# Patient Record
Sex: FEMALE | Race: WHITE | NOT HISPANIC OR LATINO
[De-identification: names, ages, dates, MRNs, and addresses within clinical notes are randomized per-mention and may not be internally consistent; named-entity substitution may affect disease eponyms.]

---

## 2020-11-09 ENCOUNTER — APPOINTMENT (OUTPATIENT)
Dept: PULMONOLOGY | Facility: HOSPITAL | Age: 60
End: 2020-11-09
Payer: COMMERCIAL

## 2020-11-09 VITALS — HEIGHT: 63 IN | BODY MASS INDEX: 40.75 KG/M2 | WEIGHT: 230 LBS

## 2020-11-09 DIAGNOSIS — I10 ESSENTIAL (PRIMARY) HYPERTENSION: ICD-10-CM

## 2020-11-09 DIAGNOSIS — D47.2 MONOCLONAL GAMMOPATHY: ICD-10-CM

## 2020-11-09 DIAGNOSIS — F17.200 NICOTINE DEPENDENCE, UNSPECIFIED, UNCOMPLICATED: ICD-10-CM

## 2020-11-09 DIAGNOSIS — G47.33 OBSTRUCTIVE SLEEP APNEA (ADULT) (PEDIATRIC): ICD-10-CM

## 2020-11-09 PROBLEM — Z00.00 ENCOUNTER FOR PREVENTIVE HEALTH EXAMINATION: Status: ACTIVE | Noted: 2020-11-09

## 2020-11-09 PROCEDURE — 99243 OFF/OP CNSLTJ NEW/EST LOW 30: CPT

## 2020-11-09 RX ORDER — OXYCODONE AND ACETAMINOPHEN 5; 325 MG/1; MG/1
5-325 TABLET ORAL
Refills: 0 | Status: ACTIVE | COMMUNITY

## 2020-11-09 RX ORDER — LISINOPRIL 40 MG/1
40 TABLET ORAL
Refills: 0 | Status: ACTIVE | COMMUNITY

## 2020-11-09 RX ORDER — METHOTREXATE 2.5 MG/1
TABLET ORAL
Refills: 0 | Status: ACTIVE | COMMUNITY

## 2020-11-09 RX ORDER — PANTOPRAZOLE SODIUM 20 MG/1
TABLET, DELAYED RELEASE ORAL
Refills: 0 | Status: ACTIVE | COMMUNITY

## 2020-11-09 RX ORDER — ATORVASTATIN CALCIUM 40 MG/1
40 TABLET, FILM COATED ORAL
Refills: 0 | Status: ACTIVE | COMMUNITY

## 2020-11-09 RX ORDER — PAROXETINE HYDROCHLORIDE 40 MG/1
TABLET, FILM COATED ORAL
Refills: 0 | Status: ACTIVE | COMMUNITY

## 2020-11-09 RX ORDER — NABUMETONE 500 MG/1
500 TABLET, FILM COATED ORAL
Refills: 0 | Status: ACTIVE | COMMUNITY

## 2020-11-09 RX ORDER — PILOCARPINE HCL 6 %
DROPS OPHTHALMIC (EYE)
Refills: 0 | Status: ACTIVE | COMMUNITY

## 2020-11-09 RX ORDER — FLUTICASONE PROPIONATE 50 UG/1
50 SPRAY, METERED NASAL
Refills: 0 | Status: ACTIVE | COMMUNITY

## 2020-11-09 NOTE — ASSESSMENT
[FreeTextEntry1] : 60-year-old woman with mild obstructive sleep apnea with other medical problems, patient also has high blood pressure.\par \par Given excessive daytime sleepiness patient will benefit with CPAP therapy.  Will order AutoPap 5 to 20 cm and look at the download data in few weeks times.

## 2020-11-09 NOTE — REVIEW OF SYSTEMS
[EDS: ESS=____] : daytime somnolence: ESS=[unfilled] [Snoring] : snoring [Witnessed Apneas] : witnessed apnea [Negative] : Psychiatric

## 2020-11-09 NOTE — PHYSICAL EXAM
[Heart Sounds] : normal S1 and S2 [Murmurs] : no murmurs [] : no respiratory distress [Auscultation Breath Sounds / Voice Sounds] : lungs were clear to auscultation bilaterally [FreeTextEntry1] : no edema [No Focal Deficits] : no focal deficits [Oriented To Time, Place, And Person] : oriented to person, place, and time

## 2020-11-09 NOTE — HISTORY OF PRESENT ILLNESS
[FreeTextEntry1] : PMD: Dr. Hawkins\par Dr. Virgen\par 60 year old woman  with history of MGUS, chronic pain, mixed connective tissue disease is here in the sleep center to address restless sleep.  Patient is sleepy with [place] sleepiness score of 12.  Patient has loud snoring, does not have any witnessed apneas.  Patient's bedtime is around 11 PM wakes up in the morning around 8 AM.  She feels tired when she wakes up. Her sleep is disturbed and wakes up few times at night. Patient does not drink caffeinated products during the daytime, patient has headaches, no nocturia. She is sleepy while driving.\par Due to above symptoms pt underwent home study which showed mild sleep apnea with desaturations.\par ahi 6.1, lowest o2 sat 77%\par

## 2021-09-27 ENCOUNTER — APPOINTMENT (OUTPATIENT)
Dept: VASCULAR SURGERY | Facility: CLINIC | Age: 61
End: 2021-09-27

## 2022-09-14 ENCOUNTER — NON-APPOINTMENT (OUTPATIENT)
Age: 62
End: 2022-09-14

## 2022-09-14 ENCOUNTER — APPOINTMENT (OUTPATIENT)
Dept: BREAST CENTER | Facility: CLINIC | Age: 62
End: 2022-09-14

## 2022-09-14 VITALS
BODY MASS INDEX: 38.36 KG/M2 | WEIGHT: 216.5 LBS | HEART RATE: 85 BPM | HEIGHT: 63 IN | DIASTOLIC BLOOD PRESSURE: 97 MMHG | SYSTOLIC BLOOD PRESSURE: 173 MMHG

## 2022-09-14 DIAGNOSIS — Z86.79 PERSONAL HISTORY OF OTHER DISEASES OF THE CIRCULATORY SYSTEM: ICD-10-CM

## 2022-09-14 DIAGNOSIS — Z80.0 FAMILY HISTORY OF MALIGNANT NEOPLASM OF DIGESTIVE ORGANS: ICD-10-CM

## 2022-09-14 DIAGNOSIS — Z87.891 PERSONAL HISTORY OF NICOTINE DEPENDENCE: ICD-10-CM

## 2022-09-14 DIAGNOSIS — Z87.09 PERSONAL HISTORY OF OTHER DISEASES OF THE RESPIRATORY SYSTEM: ICD-10-CM

## 2022-09-14 DIAGNOSIS — F12.90 CANNABIS USE, UNSPECIFIED, UNCOMPLICATED: ICD-10-CM

## 2022-09-14 DIAGNOSIS — M35.1 OTHER OVERLAP SYNDROMES: ICD-10-CM

## 2022-09-14 DIAGNOSIS — D24.2 BENIGN NEOPLASM OF LEFT BREAST: ICD-10-CM

## 2022-09-14 DIAGNOSIS — Z87.448 PERSONAL HISTORY OF OTHER DISEASES OF URINARY SYSTEM: ICD-10-CM

## 2022-09-14 DIAGNOSIS — R92.2 INCONCLUSIVE MAMMOGRAM: ICD-10-CM

## 2022-09-14 DIAGNOSIS — Z86.59 PERSONAL HISTORY OF OTHER MENTAL AND BEHAVIORAL DISORDERS: ICD-10-CM

## 2022-09-14 DIAGNOSIS — Z86.03 PERSONAL HISTORY OF NEOPLASM OF UNCERTAIN BEHAVIOR: ICD-10-CM

## 2022-09-14 DIAGNOSIS — M32.9 SYSTEMIC LUPUS ERYTHEMATOSUS, UNSPECIFIED: ICD-10-CM

## 2022-09-14 DIAGNOSIS — N60.92 UNSPECIFIED BENIGN MAMMARY DYSPLASIA OF LEFT BREAST: ICD-10-CM

## 2022-09-14 DIAGNOSIS — Z80.3 FAMILY HISTORY OF MALIGNANT NEOPLASM OF BREAST: ICD-10-CM

## 2022-09-14 DIAGNOSIS — Z78.9 OTHER SPECIFIED HEALTH STATUS: ICD-10-CM

## 2022-09-14 DIAGNOSIS — Z86.16 PERSONAL HISTORY OF COVID-19: ICD-10-CM

## 2022-09-14 DIAGNOSIS — Z87.39 PERSONAL HISTORY OF OTHER DISEASES OF THE MUSCULOSKELETAL SYSTEM AND CONNECTIVE TISSUE: ICD-10-CM

## 2022-09-14 DIAGNOSIS — Z86.73 PERSONAL HISTORY OF TRANSIENT ISCHEMIC ATTACK (TIA), AND CEREBRAL INFARCTION W/OUT RESIDUAL DEFICITS: ICD-10-CM

## 2022-09-14 PROCEDURE — 99204 OFFICE O/P NEW MOD 45 MIN: CPT

## 2022-09-14 RX ORDER — PREGABALIN 150 MG/1
150 CAPSULE ORAL
Refills: 0 | Status: DISCONTINUED | COMMUNITY
End: 2022-09-14

## 2022-09-14 NOTE — REVIEW OF SYSTEMS
[Feeling Poorly] : feeling poorly [Feeling Tired] : feeling tired [Night Sweats] : night sweats [Recent ___ Lb Weight Gain] : recent [unfilled] ~Ulb weight gain [Eyesight Problems] : eyesight problems [Loss Of Hearing] : hearing loss [Shortness Of Breath] : shortness of breath [SOB on Exertion] : shortness of breath during exertion [Abdominal Pain] : abdominal pain [Heartburn] : heartburn [Pelvic Pain] : pelvic pain [Joint Swelling] : joint swelling [Joint Stiffness] : joint stiffness [Limb Pain] : limb pain [Limb Swelling] : limb swelling [Hand Pain] : hand pain [Hand Stiffness] : stiffness of the hand [Lower Back Pain] : lower back pain [Mid Back Pain] : mid back pain [Upper Back Pain] : upper back pain [Breast Pain] : breast pain [Breast Swelling] : breast swelling [Breast Reddening] : reddening of the breast [Breast Warmth] : breast warmth [Breast Itching] : breast itching [Dimpling Of Skin] : dimpling of breast skin [Limb Weakness] : limb weakness [Difficulty Walking] : difficulty walking [Sleep Disturbances] : sleep disturbances [Anxiety] : anxiety [Depression] : depression [Hot Flashes] : hot flashes [Decrease in Strength] : generalized decrease in strength [Muscle Weakness] : muscle weakness [Feelings Of Weakness] : feelings of weakness [Negative] : Heme/Lymph

## 2022-09-16 NOTE — CONSULT LETTER
[Dear  ___] : Dear  [unfilled], [( Thank you for referring [unfilled] for consultation for _____ )] : Thank you for referring [unfilled] for consultation for [unfilled] [Please see my note below.] : Please see my note below. [Consult Closing:] : Thank you very much for allowing me to participate in the care of this patient.  If you have any questions, please do not hesitate to contact me. [Sincerely,] : Sincerely, [DrWilly  ___] : Dr. BASURTO [FreeTextEntry3] : Bee Santiago MS DO\par Breast Surgeon\par [facility] \[place] [address]\par

## 2022-09-16 NOTE — HISTORY OF PRESENT ILLNESS
[FreeTextEntry1] : This is a 62 year old female referred by Dr. Hawkins for high risk. She is interested in bilateral prophylactic double mastectomy.  The patient underwent screening mammogram on 10/11/2021. Findings showed dense breast tissue with no areas of suspicion. The patient then underwent bilateral ultrasound for density on 1/17/2022. Findings showed a left 2:00 hypoechoic lesion in which ultrasound biopsy was recommended.  The patient underwent a  left 2:00 N10 ultrasound biopsy on 1/28/2022. Pathology showed atypical lobular hyperplasia.  A MRI was recommended for high risk and done on 2/11/2022. MRI finding showed two additional areas of enhancement that MRI bx was recommended: Left 6:00 and Right 7:00.\par \par The patient underwent  left MRI  biopsy 6:00 on 2/21/2022, pathology showed benign breast tissue with small partially sclerotic intraductal papilloma,duct ectasia, and stromal fibrosi (of note the papilloma was not excised) and right MRI biopsy 7:00 on same date. Pathology showed benign tissue with dense stromal fibrosis and few microcalcifications in benign ductules.\par \par The patient is s/p bilateral excisional biopsy with benign findings approximately 30 years ago. Pathology not available at the time of visit. Her mother passed 2015, her father passed 3 months ago. She has lost 30 pounds in past 2 months.  She has been under a lot of stress but is losing weight with diet control. \par \par She does SBE. \par She has not noticed a change in her breast or a breast lump.\par She has not noticed a change in her nipple or nipple area.\par She has not noticed a change in the skin of the breast.\par She is not experiencing nipple discharge.\par She is not experiencing breast pain.\par She has not noticed a lump or lymph node under the armpit. \par \par BREAST CANCER RISK FACTORS\par Menarche:  13\par Date of LMP: 2001\par Menopause: 40yo\par Grav:  7    Para: 0\par Age at first live birth: n/a\par Nursed: n/a\par Hysterectomy: no\par Oophorectomy: no\par OCP: no \par HRT:no but pt underwent infertility treatment with 3 failed IVF\par Last pap/pelvic exam: Pt does not remember states " couple of years ago I think"\par Related family history: maternal GM breast CA@ 91 yo,pat GM breast CA in 70s\par Ashkenazi: no\par Mastery risk assessment: \par BRCA testing: yes negative \par Bra size:  38G\par \par Last mammogram:    10/11/2021                          Location: [facility]\par Report reviewed.                                 Images reviewed.\par Results: Birads 2\par Heterogeneously dense.\par No evidence of malignancy\par \par Last ultrasound:  1/17/2022                                 Location: [facility]\par Report reviewed.                                 Images reviewed. \par Results: Birads 4\par Left 2:00 N10 5mm hypoechoic mass with indistinct margins. Rec u/s bx.\par \par Last MRI:   2/11/2022                                          Location: [facility]\par Report reviewed.\par Results:Birads 4\par Left UOQ 0.5 area of enhancement correlating with known ALH\par Left central 0.8 cm enhancement MRI bx recommended\par Right LOQ 3mm focus of enhancement rec MRI bx.\par \par

## 2022-09-16 NOTE — PHYSICAL EXAM
[Normocephalic] : normocephalic [Atraumatic] : atraumatic [Supple] : supple [No Supraclavicular Adenopathy] : no supraclavicular adenopathy [Examined in the supine and seated position] : examined in the supine and seated position [Symmetrical] : symmetrical [Grade 3] : Ptosis Grade 3 [No dominant masses] : no dominant masses in right breast  [No dominant masses] : no dominant masses left breast [No Nipple Retraction] : no left nipple retraction [No Nipple Discharge] : no left nipple discharge [No Axillary Lymphadenopathy] : no left axillary lymphadenopathy [No Edema] : no edema [No Rashes] : no rashes [No Ulceration] : no ulceration [de-identified] : there is vascular congestion of the skin bilaterally which improves on supine

## 2022-09-16 NOTE — ASSESSMENT
[FreeTextEntry1] : This is a 61 yo female with recently diagnosed left breast atypical lobular hyperplasia. I reviewed her risk status. I reviewed our high risk screening program and her imaging options. I explained how breast MRI differs from mammogram and ultrasound.  I explained that if less than 3 foci seen of ALH observation may be elected. We reviewed medical oncology consult for risk reduction as well. She explained she would like excision regardless.\par \par Plan MRI for 6 month follow up since she has not had surgery yet.\par \par We reviewed risk reduction strategies including maintaining a BMI <25, limiting red meat intake and alcoholic beverages to 3 per week and exercise (150 min/ week low intensity or 75 min/week high intensity). And maintaining a normal vitamin D level.\par Educational handout provided.\par \par discussed Mastectomy risks:  Risks of mastectomy include bleeding, infection, pain, wound healing problems, necrosis of skin, seroma (fluid buildup), numbness of the chest wall/underarm/to the inner-side of upper arm (expected), shoulder pain and stiffness, recurrence of cancer, progression/spread of cancer, leaving breast cells behind, lymphedema (chronic  swelling) of the arm, nerve injury affecting the back/chest muscles, sensitivity to touch within the area of surgery (usually improves as the nerves grow back). This is in addition to medical risks of all procedures not limited to heart attack, strokes, and blood clots.\par \par I will call her after MRI and will plan for her surgery bilateral SSMx/magtrace asap\par she is doing great with weight loss!\par She knows to call or return sooner should any concerns or questions arise.\par \par

## 2023-08-21 DIAGNOSIS — N64.4 MASTODYNIA: ICD-10-CM

## 2023-10-17 ENCOUNTER — NON-APPOINTMENT (OUTPATIENT)
Age: 63
End: 2023-10-17